# Patient Record
Sex: FEMALE | Race: WHITE | Employment: FULL TIME | URBAN - METROPOLITAN AREA
[De-identification: names, ages, dates, MRNs, and addresses within clinical notes are randomized per-mention and may not be internally consistent; named-entity substitution may affect disease eponyms.]

---

## 2017-03-10 PROCEDURE — 87081 CULTURE SCREEN ONLY: CPT | Performed by: OBSTETRICS & GYNECOLOGY

## 2017-03-28 ENCOUNTER — TELEPHONE (OUTPATIENT)
Dept: OBGYN UNIT | Facility: HOSPITAL | Age: 22
End: 2017-03-28

## 2017-03-30 ENCOUNTER — HOSPITAL ENCOUNTER (INPATIENT)
Facility: HOSPITAL | Age: 22
LOS: 3 days | Discharge: HOME | End: 2017-04-02
Attending: OBSTETRICS & GYNECOLOGY | Admitting: OBSTETRICS & GYNECOLOGY
Payer: COMMERCIAL

## 2017-03-30 ENCOUNTER — APPOINTMENT (OUTPATIENT)
Dept: OBGYN CLINIC | Facility: HOSPITAL | Age: 22
End: 2017-03-30
Payer: COMMERCIAL

## 2017-03-30 PROBLEM — Z34.90 PREGNANCY: Status: ACTIVE | Noted: 2017-03-30

## 2017-03-30 PROCEDURE — 3E0P7GC INTRODUCTION OF OTHER THERAPEUTIC SUBSTANCE INTO FEMALE REPRODUCTIVE, VIA NATURAL OR ARTIFICIAL OPENING: ICD-10-PCS | Performed by: OBSTETRICS & GYNECOLOGY

## 2017-03-30 PROCEDURE — 86850 RBC ANTIBODY SCREEN: CPT | Performed by: OBSTETRICS & GYNECOLOGY

## 2017-03-30 PROCEDURE — 85027 COMPLETE CBC AUTOMATED: CPT | Performed by: OBSTETRICS & GYNECOLOGY

## 2017-03-30 PROCEDURE — 81002 URINALYSIS NONAUTO W/O SCOPE: CPT

## 2017-03-30 PROCEDURE — 86900 BLOOD TYPING SEROLOGIC ABO: CPT | Performed by: OBSTETRICS & GYNECOLOGY

## 2017-03-30 PROCEDURE — 86780 TREPONEMA PALLIDUM: CPT | Performed by: OBSTETRICS & GYNECOLOGY

## 2017-03-30 PROCEDURE — 86901 BLOOD TYPING SEROLOGIC RH(D): CPT | Performed by: OBSTETRICS & GYNECOLOGY

## 2017-03-30 RX ORDER — IBUPROFEN 600 MG/1
600 TABLET ORAL ONCE AS NEEDED
Status: DISCONTINUED | OUTPATIENT
Start: 2017-03-30 | End: 2017-03-31

## 2017-03-30 RX ORDER — ZOLPIDEM TARTRATE 5 MG/1
5 TABLET ORAL NIGHTLY PRN
Status: DISCONTINUED | OUTPATIENT
Start: 2017-03-30 | End: 2017-03-31

## 2017-03-30 RX ORDER — SODIUM CHLORIDE, SODIUM LACTATE, POTASSIUM CHLORIDE, CALCIUM CHLORIDE 600; 310; 30; 20 MG/100ML; MG/100ML; MG/100ML; MG/100ML
INJECTION, SOLUTION INTRAVENOUS CONTINUOUS
Status: DISCONTINUED | OUTPATIENT
Start: 2017-03-30 | End: 2017-03-31

## 2017-03-30 RX ORDER — TERBUTALINE SULFATE 1 MG/ML
0.25 INJECTION, SOLUTION SUBCUTANEOUS AS NEEDED
Status: DISCONTINUED | OUTPATIENT
Start: 2017-03-30 | End: 2017-03-31

## 2017-03-30 RX ORDER — DEXTROSE, SODIUM CHLORIDE, SODIUM LACTATE, POTASSIUM CHLORIDE, AND CALCIUM CHLORIDE 5; .6; .31; .03; .02 G/100ML; G/100ML; G/100ML; G/100ML; G/100ML
INJECTION, SOLUTION INTRAVENOUS AS NEEDED
Status: DISCONTINUED | OUTPATIENT
Start: 2017-03-30 | End: 2017-03-31

## 2017-03-30 NOTE — PROGRESS NOTES
Pt here with her  for a cervidil induction of labor for APLS, oriented to room and procedures explained

## 2017-03-31 PROBLEM — D68.61 ANTIPHOSPHOLIPID SYNDROME COMPLICATING PREGNANCY, ANTEPARTUM (HCC): Status: ACTIVE | Noted: 2017-03-31

## 2017-03-31 PROBLEM — O99.119 ANTIPHOSPHOLIPID SYNDROME COMPLICATING PREGNANCY, ANTEPARTUM (HCC): Status: ACTIVE | Noted: 2017-03-31

## 2017-03-31 PROCEDURE — 10907ZC DRAINAGE OF AMNIOTIC FLUID, THERAPEUTIC FROM PRODUCTS OF CONCEPTION, VIA NATURAL OR ARTIFICIAL OPENING: ICD-10-PCS | Performed by: OBSTETRICS & GYNECOLOGY

## 2017-03-31 PROCEDURE — 3E033VJ INTRODUCTION OF OTHER HORMONE INTO PERIPHERAL VEIN, PERCUTANEOUS APPROACH: ICD-10-PCS | Performed by: OBSTETRICS & GYNECOLOGY

## 2017-03-31 PROCEDURE — 88305 TISSUE EXAM BY PATHOLOGIST: CPT | Performed by: OBSTETRICS & GYNECOLOGY

## 2017-03-31 PROCEDURE — 0KQM0ZZ REPAIR PERINEUM MUSCLE, OPEN APPROACH: ICD-10-PCS | Performed by: OBSTETRICS & GYNECOLOGY

## 2017-03-31 RX ORDER — ACETAMINOPHEN 325 MG/1
650 TABLET ORAL EVERY 4 HOURS PRN
Status: DISCONTINUED | OUTPATIENT
Start: 2017-03-31 | End: 2017-04-02

## 2017-03-31 RX ORDER — BISACODYL 10 MG
10 SUPPOSITORY, RECTAL RECTAL ONCE AS NEEDED
Status: ACTIVE | OUTPATIENT
Start: 2017-03-31 | End: 2017-03-31

## 2017-03-31 RX ORDER — SIMETHICONE 80 MG
80 TABLET,CHEWABLE ORAL 3 TIMES DAILY PRN
Status: DISCONTINUED | OUTPATIENT
Start: 2017-03-31 | End: 2017-04-02

## 2017-03-31 RX ORDER — HYDROMORPHONE HYDROCHLORIDE 1 MG/ML
1 INJECTION, SOLUTION INTRAMUSCULAR; INTRAVENOUS; SUBCUTANEOUS ONCE
Status: COMPLETED | OUTPATIENT
Start: 2017-03-31 | End: 2017-03-31

## 2017-03-31 RX ORDER — HYDROCODONE BITARTRATE AND ACETAMINOPHEN 5; 325 MG/1; MG/1
2 TABLET ORAL EVERY 4 HOURS PRN
Status: DISCONTINUED | OUTPATIENT
Start: 2017-03-31 | End: 2017-04-02

## 2017-03-31 RX ORDER — NALBUPHINE HCL 10 MG/ML
2.5 AMPUL (ML) INJECTION
Status: DISCONTINUED | OUTPATIENT
Start: 2017-03-31 | End: 2017-04-02

## 2017-03-31 RX ORDER — EPHEDRINE SULFATE 50 MG/ML
5 INJECTION, SOLUTION INTRAVENOUS AS NEEDED
Status: DISCONTINUED | OUTPATIENT
Start: 2017-03-31 | End: 2017-03-31

## 2017-03-31 RX ORDER — DOCUSATE SODIUM 100 MG/1
100 CAPSULE, LIQUID FILLED ORAL
Status: DISCONTINUED | OUTPATIENT
Start: 2017-03-31 | End: 2017-04-02

## 2017-03-31 RX ORDER — HYDROCODONE BITARTRATE AND ACETAMINOPHEN 5; 325 MG/1; MG/1
1 TABLET ORAL EVERY 4 HOURS PRN
Status: DISCONTINUED | OUTPATIENT
Start: 2017-03-31 | End: 2017-04-02

## 2017-03-31 RX ORDER — IBUPROFEN 600 MG/1
600 TABLET ORAL EVERY 6 HOURS
Status: DISCONTINUED | OUTPATIENT
Start: 2017-03-31 | End: 2017-04-02

## 2017-03-31 RX ORDER — ENOXAPARIN SODIUM 100 MG/ML
40 INJECTION SUBCUTANEOUS EVERY EVENING
Status: DISCONTINUED | OUTPATIENT
Start: 2017-03-31 | End: 2017-04-02

## 2017-03-31 RX ORDER — ZOLPIDEM TARTRATE 5 MG/1
5 TABLET ORAL NIGHTLY PRN
Status: DISCONTINUED | OUTPATIENT
Start: 2017-03-31 | End: 2017-04-02

## 2017-03-31 NOTE — PROGRESS NOTES
Cervidil removed. SVE 6/100/0. Pt requesting epidural at this time. Pt up to BR. Dr Anselmo Scheuermann called per this RN and notified of pt request for epidural. Orders received at this time.

## 2017-03-31 NOTE — PROGRESS NOTES
Pt taken to postpartum in stable condition per WC. Cares of pt transferred to [facility] RN at this time.

## 2017-03-31 NOTE — OPERATIVE REPORT
She was found to be complete/+2 with with deep variable decelerations recurrently for 10 minutes. She pushed with good effort for 5 minutes under epidural anesthesia. She was then consented for vacuum delivery.  Risks were reviewed including intracranial he Anesthesia    Method:  Epidural   Analgesics:   Analgesics   DILAUDID 1 MG/ML IJ SOLN                [place] Delivery     Changing the 's delivery date/time could affect patient care.:     Delivery date/time:   3/31/17 1049   Delivery type:  V RN:  Shani Palacios   Final count MD:  Nuris Ramey

## 2017-03-31 NOTE — PROGRESS NOTES
Pt up to BR with assistance from this RN. Gait steady. Pt voids without difficulty. Kathryn-bottle given. Dermoplast to perineum. Pt up to [facility] without incident.

## 2017-03-31 NOTE — PROGRESS NOTES
Patient admit to room 2216 in stable condition. ID bands verified. Hugs and Kisses tags remain in place. Instructional sheets at bedside, reviewed and signed.

## 2017-03-31 NOTE — PROGRESS NOTES
Vacuum applied per Dr Isaak Stapleton,  of head. Tight NC x1 clamped and cut per Dr Isaak Stapleton. Vacuum off.

## 2017-03-31 NOTE — PROGRESS NOTES
of viable female over 2nd degree lac per Dr Raymond Luna. Infant to mother's chest. Dried and stimulated. Infant taken to radiant warmer. See del summary for further details.

## 2017-04-01 PROCEDURE — 85025 COMPLETE CBC W/AUTO DIFF WBC: CPT | Performed by: OBSTETRICS & GYNECOLOGY

## 2017-04-01 RX ORDER — POLYETHYLENE GLYCOL 3350 17 G/17G
17 POWDER, FOR SOLUTION ORAL DAILY PRN
Status: DISCONTINUED | OUTPATIENT
Start: 2017-04-01 | End: 2017-04-02

## 2017-04-01 NOTE — PLAN OF CARE
POSTPARTUM    • Long Term Goal:Experiences normal postpartum course Progressing    • Appropriate maternal -  bonding Progressing

## 2017-04-01 NOTE — PROGRESS NOTES
OB Progress Note PPD#1  S: Feels well. Ambulating, eating. Pain controlled. Minimal VB. Breast pumping. O:   Blood pressure 116/68, pulse 89, temperature 98.2 °F (36.8 °C), temperature source Oral, resp.  rate 20, height 5' 3\" (1.6 m), weight 167 lb (75

## 2017-04-01 NOTE — CM/SW NOTE
mercedes spoke to RN, pt will likely dc on Sunday. RN requested to consult psych liaison for EPDS 8 per protocol.  sw to remain available

## 2017-04-02 VITALS
OXYGEN SATURATION: 97 % | BODY MASS INDEX: 29.59 KG/M2 | RESPIRATION RATE: 18 BRPM | HEIGHT: 63 IN | TEMPERATURE: 98 F | SYSTOLIC BLOOD PRESSURE: 109 MMHG | WEIGHT: 167 LBS | HEART RATE: 79 BPM | DIASTOLIC BLOOD PRESSURE: 74 MMHG

## 2017-04-02 PROCEDURE — 85025 COMPLETE CBC W/AUTO DIFF WBC: CPT | Performed by: OBSTETRICS & GYNECOLOGY

## 2017-04-02 RX ORDER — ENOXAPARIN SODIUM 100 MG/ML
40 INJECTION SUBCUTANEOUS EVERY EVENING
Qty: 42 SYRINGE | Refills: 0 | Status: SHIPPED | OUTPATIENT
Start: 2017-04-02 | End: 2017-05-14

## 2017-04-02 NOTE — BH PROGRESS NOTE
MAJOR PPD SCREENING    Reason for Referral: EPDS =8    Current Stressors:    History of PPD: First baby, patient denies any history of depression. Financial: Patient denies.    Other:     Mental Health Status:    Current Symptoms: Denies   Appearance/Genera

## 2017-04-02 NOTE — PROGRESS NOTES
Discharge instructions discussed and all questions answered. Pt feels comfortable caring for self and infant. IDbands verified. Discharged home in stable condition.

## 2017-04-02 NOTE — PROGRESS NOTES
[facility]  Post-Partum Progress Note    Jer Ayers Patient Status:  Inpatient    1995 MRN [id number]   [facility] 2SW-J Attending Edward Villanueva MD   Hosp Day # 3 PCP Carola Bush MD     SUBJECTIVE:    Postpartum Day 2:    T

## 2017-04-03 NOTE — H&P
Pt is a 20 y/o  who present for induction with known APL syndrome - moderate + anticardioipin    PMH - SAB X2              Negative GBS              Normal 1 hour glucose test              Normal 1st trimester scree  Pt was maintained on Baby ASA an

## 2017-04-04 ENCOUNTER — TELEPHONE (OUTPATIENT)
Dept: OBGYN UNIT | Facility: HOSPITAL | Age: 22
End: 2017-04-04

## 2017-04-04 NOTE — PROGRESS NOTES
Outgoing cradle call completed. Mom reports that she and infant are doing well. No complaints of PPB or PPD. Has had pediatrician F/U visit on 4/5. Has PP F/U visit scheduled. Reviewed basic infant and self care; verbalizes understanding.   Encouraged to f

## 2018-10-22 PROCEDURE — 88175 CYTOPATH C/V AUTO FLUID REDO: CPT | Performed by: OBSTETRICS & GYNECOLOGY

## 2020-02-28 ENCOUNTER — HOSPITAL ENCOUNTER (OUTPATIENT)
Dept: MAMMOGRAPHY | Facility: HOSPITAL | Age: 25
Discharge: HOME | End: 2020-02-28
Attending: OBSTETRICS & GYNECOLOGY
Payer: COMMERCIAL

## 2020-02-28 DIAGNOSIS — N63.0 LUMP OR MASS IN BREAST: ICD-10-CM

## 2020-02-28 PROCEDURE — 76641 ULTRASOUND BREAST COMPLETE: CPT | Performed by: OBSTETRICS & GYNECOLOGY

## 2020-02-28 PROCEDURE — 77066 DX MAMMO INCL CAD BI: CPT | Performed by: OBSTETRICS & GYNECOLOGY

## 2023-06-06 NOTE — PROGRESS NOTES
Pt is d/c'd to home in stable condition, not in active labor. Both written and verbal instructions provided. Questions answered. Pt verbalizes understanding and agreement.

## 2023-06-06 NOTE — DISCHARGE INSTRUCTIONS
Discharge Instructions    Diet: Regular  Activity: Normal activity         General Instructions    Call your [facility] doctor if: Fluid leaking from your vagina;Uterine contractions 10 minutes or closer for 1 to 2 hours;Uterine contractions increasing in intensity and frequency; Decrease in fetal movement;Vaginal bleeding;Temperature greater than 100F;Vaginal or rectal pressure

## 2023-06-06 NOTE — PROGRESS NOTES
06/06/23 1337   Nonstress Test   Multiple NST?  No   Variability 6-25 BPM   Decelerations None   Accelerations Yes   Acoustic Stimulator No   Baseline 130 BPM   Uterine Irritability No   Contractions Irregular   Interpretation   Nonstress Test Interpretation Reactive   Nonstress Test Second Interpretation   (viewed by Dr Tano Zimmerman in dept)

## 2023-06-06 NOTE — PROGRESS NOTES
Pt is a  at 34 3/7 wk iup who is brought to room triage-2 for NST. Pt was in the office today and the baseline was difficult to determine. Pt sent to L&D for further eval. EFM tested and applied. POC discussed and questions answered. Pt reports +fm and denies any LOF, VB or UC's.

## 2023-06-24 NOTE — PROGRESS NOTES
Patient up to bathroom with assist x 2. Voided 700mLat this time. Patient transferred to mother/baby room 2192 per wheelchair in stable condition with baby and personal belongings. Accompanied by significant other and staff. Report given to mother/baby RN.

## 2023-06-24 NOTE — PROGRESS NOTES
Received pt to the unit via ambulation. Pt to triage room and changed and into bed that is in the low locked position. efm explained and then applied. Pt is a 26 yo  with an eddie of 37.0 weeks. Pt c/o srom at 2230, clear fluid noted on the chux. Pt with antiphospholipid antibody syndrome. Currently om n heparin- last took 5000 units at 2000. Pt amaury bianchi other pregnancy issues. Pt is hx of anxiety and depression and on zoloft. Pt informed of poc to cnfirn rom. Pt in agreement.   Call bell within easy reach

## 2023-06-24 NOTE — L&D DELIVERY NOTE
Maureen Ayon, Girl [[id number]]    Labor Events     labor?: No   steroids?: None  Antibiotics received during labor?: No  Antibiotics (enter # doses in comment): none  Rupture date/time: 2023     Rupture type: SROM  Fluid color: Clear  Augmentation: Oxytocin  Indications for augmentation: Ineffective Contraction Pattern     Labor Event Times    Labor onset date/time: 2023 0400  Dilation complete date/time: 2023 7858     [place] Presentation    Presentation: Vertex     Operative Delivery    No data filed     Shoulder Dystocia    No data filed     Anesthesia    Method: Epidural          [place] Delivery    Delivery date/time:  23 08:28:00   Delivery type: Normal spontaneous vaginal delivery    Details:        Delivery location: delivery room      Delivery Providers    Delivering Clinician: Arnold Winn MD   Delivery personnel:  Provider Role   Roland Barnett, RN Baby Nurse   Moncho Pruett, RN Delivery Nurse         Cord    Complications: None  Timed cord clamping: Yes  Time in sec: 30  Cord blood disposition: cord blood bank  Gases sent?: No     Resuscitation    Method: None      Measurements    Weight: 2850 g 6 lb 4.5 oz Length: 47 cm   Head circum.: 32.5 cm Chest circum.: 31 cm      Abdominal circum.: 30 cm       Placenta    Date/time: 2023 0831  Removal: Spontaneous  Appearance: Intact  Disposition: Pathology     Apgars    Living status: Living   Apgar Scoring Key:    0 1 2    Skin color Blue or pale Acrocyanotic Completely pink    Heart rate Absent <100 bpm >100 bpm    Reflex irritability No response Grimace Cry or active withdrawal    Muscle tone Limp Some flexion Active motion    Respiratory effort Absent Weak cry; hypoventilation Good, crying            1 Minute:  5 Minute:  10 Minute:  15 Minute:  20 Minute:    Skin color: 0  1       Heart rate: 2  2       Reflex irritablity: 2  2       Muscle tone: 2  2       Respiratory effort: 2  2       Total: 8  9           Apgars assigned by: Malva Osgood RN    disposition: with mother         Skin to Skin    No data filed     Vaginal Count    Initial count RN: Prosper Oden RN  Initial count Tech: Akin Vera    Initial counts 11   0    Final counts 11   1       Delivery (Maternal)    No data filed                                                                  Vaginal Delivery Note          Griselda Willis Patient Status:  Inpatient    1995 MRN [id number]   Location [address] [place] [address] Attending Tate Crow MD   Hosp Day # 0 PCP Ab Warner MD       Pre Op Dx:  IUP at 40 weeks; labor; h/o APLS    Post Op Dx: Same    Procedure: Normal Spontaneous Vaginal Delivery    Surgeon: Guy Wright    Anesthesia: Epidural    EBL: 50cc    Findings: 1) A viable female infant with Apgars of 8 and 9 weighing 6lbs 5 oz was delivered in the OA position. 2) 3 vessel cord. 3)Normal appearing placenta spontaneously delivered. 4)first degree laceration    Procedure:  Patient is a 30y/o   who presented at 40 0/7 weeks gestation complaining of labor. Patient was admitted to labor and delivery. Her labor progressed and upon complete dilation she had a strong urge to push and so was encouraged to do so. Patient pushed for approximately 2mins at which time the head was . As the head was delivered the legs were lowered and the perineum was supported to decrease the risk of tearing. Infant was delivered in the OA position. The infants mouth and nose were bulb suctioned. The shoulders rotated easily and the anterior shoulder delivered easily followed by the posterior shoulder and remainder of the infant. The infant was dried and suctioned. The cord was doubly clamped and cut after 30secs. The baby was placed on the mothers abdomen vigorous and crying. The placenta spontaneously delivered intact shortly thereafter.     Examination of the cervix, vagina, and perineum demonstrated a first degree laceration. The skin was re-approximated using 3-0 vicryl rapide. A recto-vaginal exam was normal and bleeding was minimal.  The patient was then moved to the supine position in stable condition. Counts were correct.     Complications:  None    Herson Johnson MD  6/24/2023  8:38 AM

## 2023-06-24 NOTE — PROGRESS NOTES
Received report from [facility] , patient transferred from triage 3 to room 114. Pt is a 25year old female admitted to 114/114-A. Patient presents with:  R/o Rom: Leaking at 65       Pt is [id number] 37w0d intra-uterine pregnancy. History obtained, consents signed. Oriented to room, staff, and plan of care.

## 2023-06-24 NOTE — PLAN OF CARE
Problem: BIRTH - VAGINAL/ SECTION  Goal: Fetal and maternal status remain reassuring during the birth process  Description: INTERVENTIONS:  - Monitor vital signs  - Monitor fetal heart rate  - Monitor uterine activity  - Monitor labor progression (vaginal delivery)  - DVT prophylaxis (C/S delivery)  - Surgical antibiotic prophylaxis (C/S delivery)  Outcome: Progressing     Problem: PAIN - ADULT  Goal: Verbalizes/displays adequate comfort level or patient's stated pain goal  Description: INTERVENTIONS:  - Encourage pt to monitor pain and request assistance  - Assess pain using appropriate pain scale  - Administer analgesics based on type and severity of pain and evaluate response  - Implement non-pharmacological measures as appropriate and evaluate response  - Consider cultural and social influences on pain and pain management  - Manage/alleviate anxiety  - Utilize distraction and/or relaxation techniques  - Monitor for opioid side effects  - Notify MD/LIP if interventions unsuccessful or patient reports new pain  - Anticipate increased pain with activity and pre-medicate as appropriate  Outcome: Progressing     Problem: ANXIETY  Goal: Will report anxiety at manageable levels  Description: INTERVENTIONS:  - Administer medication as ordered  - Teach and rehearse alternative coping skills  - Provide emotional support with 1:1 interaction with staff  Outcome: Progressing     Problem: Patient/Family Goals  Goal: Patient/Family Long Term Goal  Description: Patient's Long Term Goal: pain management    Interventions:  -   - See additional Care Plan goals for specific interventions  Outcome: Progressing  Goal: Patient/Family Short Term Goal  Description: Patient's Short Term Goal: safe delivery of infant    Interventions:   -   - See additional Care Plan goals for specific interventions  Outcome: Progressing     Problem: SAFETY ADULT - FALL  Goal: Free from fall injury  Description: INTERVENTIONS:  - Assess pt frequently for physical needs  - Identify cognitive and physical deficits and behaviors that affect risk of falls.   - [place] fall precautions as indicated by assessment.  - Educate pt/family on patient safety including physical limitations  - Instruct pt to call for assistance with activity based on assessment  - Modify environment to reduce risk of injury  - Provide assistive devices as appropriate  - Consider OT/PT consult to assist with strengthening/mobility  - Encourage toileting schedule  Outcome: Progressing

## 2023-06-24 NOTE — ANESTHESIA PROCEDURE NOTES
Labor Analgesia    Date/Time: 6/24/2023 6:33 AM    Performed by: Mane Davison MD  Authorized by: Mane Davison MD      General Information and Staff    Start Time:  6/24/2023 6:33 AM  End Time:  6/24/2023 6:40 AM  Anesthesiologist:  Mane Davison MD  Performed by:   Anesthesiologist  Patient Location:  OB  Site Identification: surface landmarks    Reason for Block: labor epidural    Preanesthetic Checklist: patient identified, IV checked, risks and benefits discussed, monitors and equipment checked, pre-op evaluation, timeout performed, IV bolus, anesthesia consent and sterile technique used      Procedure Details    Patient Position:  Sitting  Prep: ChloraPrep    Monitoring:  Heart rate and continuous pulse ox  Approach:  Midline    Epidural Needle    Injection Technique:  COLLETTE air  Needle Type:  Tuohy  Needle Gauge:  17 G  Needle Length:  3.375 in  Needle Insertion Depth:  5    Spinal Needle      Catheter    Catheter Type:  End hole  Catheter Size:  19 G  Catheter at Skin Depth:  10  Test Dose:  Negative    Assessment      Additional Comments

## 2023-06-24 NOTE — PLAN OF CARE
Problem: BIRTH - VAGINAL/ SECTION  Goal: Fetal and maternal status remain reassuring during the birth process  Description: INTERVENTIONS:  - Monitor vital signs  - Monitor fetal heart rate  - Monitor uterine activity  - Monitor labor progression (vaginal delivery)  - DVT prophylaxis (C/S delivery)  - Surgical antibiotic prophylaxis (C/S delivery)  2023 by Arun Colon RN  Outcome: Completed  2023 by Arun Colon RN  Outcome: Progressing     Problem: PAIN - ADULT  Goal: Verbalizes/displays adequate comfort level or patient's stated pain goal  Description: INTERVENTIONS:  - Encourage pt to monitor pain and request assistance  - Assess pain using appropriate pain scale  - Administer analgesics based on type and severity of pain and evaluate response  - Implement non-pharmacological measures as appropriate and evaluate response  - Consider cultural and social influences on pain and pain management  - Manage/alleviate anxiety  - Utilize distraction and/or relaxation techniques  - Monitor for opioid side effects  - Notify MD/LIP if interventions unsuccessful or patient reports new pain  - Anticipate increased pain with activity and pre-medicate as appropriate  2023 by Arun Colon RN  Outcome: Completed  2023 by Arun Colon RN  Outcome: Progressing     Problem: ANXIETY  Goal: Will report anxiety at manageable levels  Description: INTERVENTIONS:  - Administer medication as ordered  - Teach and rehearse alternative coping skills  - Provide emotional support with 1:1 interaction with staff  2023 by Arun Colon RN  Outcome: Completed  2023 by Arun Colon RN  Outcome: Progressing     Problem: Patient/Family Goals  Goal: Patient/Family Long Term Goal  Description: Patient's Long Term Goal: pain management    Interventions:  -   - See additional Care Plan goals for specific interventions  2023 by Arun Colon RN  Outcome: Completed  6/24/2023 0806 by Moncho Pruett RN  Outcome: Progressing  Goal: Patient/Family Short Term Goal  Description: Patient's Short Term Goal: safe delivery of infant    Interventions:   -   - See additional Care Plan goals for specific interventions  6/24/2023 1019 by Moncho Pruett RN  Outcome: Completed  6/24/2023 0806 by Moncho Pruett RN  Outcome: Progressing

## 2023-06-24 NOTE — PLAN OF CARE
Problem: BIRTH - VAGINAL/ SECTION  Goal: Fetal and maternal status remain reassuring during the birth process  Description: INTERVENTIONS:  - Monitor vital signs  - Monitor fetal heart rate  - Monitor uterine activity  - Monitor labor progression (vaginal delivery)  - DVT prophylaxis (C/S delivery)  - Surgical antibiotic prophylaxis (C/S delivery)  2023 by Javier Tabares RN  Outcome: Progressing  2023 by Javier Tabares RN  Outcome: Progressing     Problem: PAIN - ADULT  Goal: Verbalizes/displays adequate comfort level or patient's stated pain goal  Description: INTERVENTIONS:  - Encourage pt to monitor pain and request assistance  - Assess pain using appropriate pain scale  - Administer analgesics based on type and severity of pain and evaluate response  - Implement non-pharmacological measures as appropriate and evaluate response  - Consider cultural and social influences on pain and pain management  - Manage/alleviate anxiety  - Utilize distraction and/or relaxation techniques  - Monitor for opioid side effects  - Notify MD/LIP if interventions unsuccessful or patient reports new pain  - Anticipate increased pain with activity and pre-medicate as appropriate  2023 by Javier Tabares RN  Outcome: Progressing  2023 by Javier Tabares RN  Outcome: Progressing     Problem: ANXIETY  Goal: Will report anxiety at manageable levels  Description: INTERVENTIONS:  - Administer medication as ordered  - Teach and rehearse alternative coping skills  - Provide emotional support with 1:1 interaction with staff  2023 by Javier Tabares RN  Outcome: Progressing  2023 by Javier Tabares RN  Outcome: Progressing     Problem: Patient/Family Goals  Goal: Patient/Family Long Term Goal  Description: Patient's Long Term Goal: pain management    Interventions:  -   - See additional Care Plan goals for specific interventions  2023 by Javier Tabares RN  Outcome: Progressing  6/24/2023 0257 by Cameron Pope RN  Outcome: Progressing  Goal: Patient/Family Short Term Goal  Description: Patient's Short Term Goal: safe delivery of infant    Interventions:   -   - See additional Care Plan goals for specific interventions  6/24/2023 0257 by Cameron Pope RN  Outcome: Progressing  6/24/2023 0257 by Cameron Pope RN  Outcome: Progressing

## 2023-06-25 NOTE — CM/SW NOTE
23 1100   CM/SW Referral Data   Referral Source Nurse   Reason for Referral Psychosocial assessment  (EDPS)   Informant Patient;Spouse/Significant Other;EMR;Clinical Staff Member       Received order for EDPS of 9. Contacted by RN and informed of discharge today. Met with mother and father of baby at bedside. FOB present with MOB consent. MOB s/p delivery of baby girl and also has 10 y/o dtr from a previous marriage. She stated previous history of severe post partum anxiety with panic attacks after birth of her first child. She took medications starting at 6 weeks post partum and noticed relief. She remained on medications for approx 1-1.5 years and was weaned off. She then noticed beginning of anxiety symptoms during this pregnancy and after conversation with ob/gyn has started zoloft. She has noticed improvement and feels symptoms currently controlled. She does also have information for counseling if needed. She stated strong support system with FOB and family members. Noted ongoing stressors in relationship with her ex- related to custody of MOB's older daughter, but she indicates these are manageable with family/legal support. Encouraged continued communication with Mds to monitor symptoms and provide support.  mood disorders information and support resources provided. No other needs/concerns at this time. Updated RN. / to remain available for support and/or discharge planning.      Shavonne Hemphill, [facility]  Discharge Planner  [phone]

## 2023-06-25 NOTE — PLAN OF CARE
Problem: POSTPARTUM  Goal: Long Term Goal:Experiences normal postpartum course  Description: INTERVENTIONS:  - Assess and monitor vital signs and lab values. - Assess fundus and lochia. - Provide ice/sitz baths for perineum discomfort. - Monitor healing of incision/episiotomy/laceration, and assess for signs and symptoms of infection and hematoma. - Assess bladder function and monitor for bladder distention.  - Provide/instruct/assist with pericare as needed. - Provide VTE prophylaxis as needed. - Monitor bowel function.  - Encourage ambulation and provide assistance as needed. - Assess and monitor emotional status and provide social service/psych resources as needed. - Utilize standard precautions and use personal protective equipment as indicated. Ensure aseptic care of all intravenous lines and invasive tubes/drains.  - Obtain immunization and exposure to communicable diseases history. Outcome: Progressing  Goal: Optimize infant feeding at the breast  Description: INTERVENTIONS:  - Initiate breast feeding within first hour after birth. - Monitor effectiveness of current breast feeding efforts. - Assess support systems available to mother/family.  - Identify cultural beliefs/practices regarding lactation, letdown techniques, maternal food preferences. - Assess mother's knowledge and previous experience with breast feeding.  - Provide information as needed about early infant feeding cues (e.g., rooting, lip smacking, sucking fingers/hand) versus late cue of crying.  - Discuss/demonstrate breast feeding aids (e.g., infant sling, nursing footstool/pillows, and breast pumps). - Encourage mother/other family members to express feelings/concerns, and actively listen. - Educate father/SO about benefits of breast feeding and how to manage common lactation challenges.   - Recommend avoidance of specific medications or substances incompatible with breast feeding.  - Assess and monitor for signs of nipple pain/trauma. - Instruct and provide assistance with proper latch. - Review techniques for milk expression (breast pumping) and storage of breast milk. Provide pumping equipment/supplies, instructions and assistance, as needed. - Encourage rooming-in and breast feeding on demand.  - Encourage skin-to-skin contact. - Provide LC support as needed. - Assess for and manage engorgement. - Provide breast feeding education handouts and information on community breast feeding support. Outcome: Progressing  Goal: Establishment of adequate milk supply with medication/procedure interruptions  Description: INTERVENTIONS:  - Review techniques for milk expression (breast pumping). - Provide pumping equipment/supplies, instructions, and assistance until it is safe to breastfeed infant. Outcome: Progressing  Goal: Experiences normal breast weaning course  Description: INTERVENTIONS:  - Assess for and manage engorgement. - Instruct on breast care. - Provide comfort measures. Outcome: Progressing  Goal: Appropriate maternal -  bonding  Description: INTERVENTIONS:  - Assess caregiver- interactions. - Assess caregiver's emotional status and coping mechanisms. - Encourage caregiver to participate in  daily care. - Assess support systems available to mother/family.  - Provide /case management support as needed. Outcome: Progressing     Problem: SAFETY ADULT - FALL  Goal: Free from fall injury  Description: INTERVENTIONS:  - Assess pt frequently for physical needs  - Identify cognitive and physical deficits and behaviors that affect risk of falls.   - [place] fall precautions as indicated by assessment.  - Educate pt/family on patient safety including physical limitations  - Instruct pt to call for assistance with activity based on assessment  - Modify environment to reduce risk of injury  - Provide assistive devices as appropriate  - Consider OT/PT consult to assist with strengthening/mobility  - Encourage toileting schedule  Outcome: Completed

## 2023-06-25 NOTE — PROGRESS NOTES
All discharge instructions reviewed with Pt and family, they verbalize understanding of all instructions. ID bands matched with infant. HUGS/KISSES removed.

## (undated) NOTE — IP AVS SNAPSHOT
[facility] [place] [address] [place], [address] ~ [phone]                Discharge Summary   3/30/2017    [facility]           Admission Information        Provider Department    3/30/2017 Edvin Jones MD  [id number]      Mary Fontanez Take 1 tablet by mouth daily.     Ta Anderson     [    ]    [    ]    [    ]    [    ]         STOP taking these medications     Heparin Sodium (Porcine) 5000 UNIT/ML Soln                Where to Get Your Medications      These medications were sent t Recent Hematology Lab Results  (Last 3 results in the past 90 days)    WBC RBC Hemoglobin Hematocrit MCV MCH MCHC RDW Platelet MPV    (53/65/98)  14.1 (H) (04/02/17)  3.33 (L) (04/02/17)  10.1 (L) (04/02/17)  30.8 (L) (04/02/17)  92.5 -- -- -- (04/02/17) Cervical Ripening Resolved   Safety Resolved   Epidural Information Resolved   Labor Activity Resolved   Review Plan of Care Resolved   Pain Management Resolved   Fetal Monitoring Resolved   Delivery Process Resolved   Psychosocial/Spiritual Support Resolv view more details from this visit by going to [email or web address]. If you've recently had a stay at the Hospital you can access your discharge instructions in [employer] by going to Visits < Admission Summaries.  If you've been to the Emergency Depar